# Patient Record
Sex: MALE | Race: WHITE | Employment: FULL TIME | URBAN - METROPOLITAN AREA
[De-identification: names, ages, dates, MRNs, and addresses within clinical notes are randomized per-mention and may not be internally consistent; named-entity substitution may affect disease eponyms.]

---

## 2018-10-29 ENCOUNTER — APPOINTMENT (OUTPATIENT)
Dept: GENERAL RADIOLOGY | Age: 23
End: 2018-10-29
Payer: COMMERCIAL

## 2018-10-29 ENCOUNTER — HOSPITAL ENCOUNTER (EMERGENCY)
Age: 23
Discharge: HOME | End: 2018-10-29
Attending: EMERGENCY MEDICINE
Payer: COMMERCIAL

## 2018-10-29 VITALS
OXYGEN SATURATION: 100 % | DIASTOLIC BLOOD PRESSURE: 52 MMHG | WEIGHT: 130 LBS | HEART RATE: 80 BPM | SYSTOLIC BLOOD PRESSURE: 138 MMHG | HEIGHT: 66 IN | RESPIRATION RATE: 16 BRPM | TEMPERATURE: 98.5 F | BODY MASS INDEX: 20.89 KG/M2

## 2018-10-29 DIAGNOSIS — S63.501A SPRAIN OF RIGHT WRIST, INITIAL ENCOUNTER: Primary | ICD-10-CM

## 2018-10-29 PROCEDURE — 99283 EMERGENCY DEPT VISIT LOW MDM: CPT

## 2018-10-29 PROCEDURE — 73110 X-RAY EXAM OF WRIST: CPT

## 2018-10-29 RX ORDER — IBUPROFEN 800 MG/1
800 TABLET ORAL EVERY 8 HOURS PRN
Qty: 21 TABLET | Refills: 0 | Status: SHIPPED | OUTPATIENT
Start: 2018-10-29 | End: 2018-11-05

## 2018-10-29 ASSESSMENT — PAIN DESCRIPTION - ORIENTATION: ORIENTATION: RIGHT

## 2018-10-29 ASSESSMENT — PAIN DESCRIPTION - PAIN TYPE: TYPE: ACUTE PAIN

## 2018-10-29 ASSESSMENT — PAIN SCALES - GENERAL: PAINLEVEL_OUTOF10: 7

## 2018-10-29 ASSESSMENT — PAIN DESCRIPTION - LOCATION: LOCATION: WRIST

## 2018-10-29 ASSESSMENT — PAIN DESCRIPTION - DESCRIPTORS: DESCRIPTORS: THROBBING;SHARP

## 2018-10-29 ASSESSMENT — PAIN DESCRIPTION - FREQUENCY: FREQUENCY: CONTINUOUS

## 2018-10-30 NOTE — ED PROVIDER NOTES
HPI:  10/29/18, Time: 9:56 PM         Luis Daniel Dolan is a 26 y.o. male presenting to the ED for wrist pain, beginning 1 day ago. The complaint has been persistent, moderate in severity, and worsened by changing position/ movement of the wrist.  Patient thinks he may have inadvertently broken his fall by stretching out his hand. Patient denies any other injury sustained no other complaints pain no marked swelling or redness or warmth of the wrist and no other complaints he does have a prior history of previous wrist surgery    Review of Systems:   Pertinent positives and negatives are stated within HPI, all other systems reviewed and are negative.      --------------------------------------------- PAST HISTORY ---------------------------------------------  Past Medical History:  has a past medical history of Asthma. Past Surgical History:  has a past surgical history that includes Wrist surgery (Right); Foot fracture surgery (Right); and Refractive surgery. Social History:  reports that he has never smoked. He has never used smokeless tobacco. He reports that he does not drink alcohol or use drugs. Family History: family history is not on file. The patients home medications have been reviewed. Allergies: Patient has no known allergies. -------------------------------------------------- RESULTS -------------------------------------------------  All laboratory and radiology results have been personally reviewed by myself   LABS:  No results found for this visit on 10/29/18. RADIOLOGY:  Interpreted by Radiologist.  XR WRIST RIGHT (MIN 3 VIEWS)   Final Result   No acute osseous findings. ------------------------- NURSING NOTES AND VITALS REVIEWED ---------------------------   The nursing notes within the ED encounter and vital signs as below have been reviewed.    BP (!) 138/52   Pulse 80   Temp 98.5 °F (36.9 °C) (Oral)   Resp 16   Ht 5' 6\" (1.676 m)   Wt 130 lb (59 kg)   SpO2 100%

## 2020-02-07 LAB — TSH SERPL DL<=0.05 MIU/L-ACNC: NORMAL M[IU]/L

## 2021-11-05 ENCOUNTER — TELEPHONE (OUTPATIENT)
Dept: PRIMARY CARE CLINIC | Age: 26
End: 2021-11-05
Payer: COMMERCIAL

## 2021-11-05 DIAGNOSIS — Z71.3 DIETARY COUNSELING: Primary | ICD-10-CM

## 2021-11-05 PROCEDURE — S9470 NUTRITIONAL COUNSELING, DIET: HCPCS | Performed by: FAMILY MEDICINE

## 2021-11-05 NOTE — TELEPHONE ENCOUNTER
Patient asking if there is anyone you recommend for a nutritionist or dietician?     Patient wanting to start a plan to eat better

## 2021-12-13 ENCOUNTER — TELEPHONE (OUTPATIENT)
Dept: PRIMARY CARE CLINIC | Age: 26
End: 2021-12-13

## 2021-12-13 ENCOUNTER — OFFICE VISIT (OUTPATIENT)
Dept: PRIMARY CARE CLINIC | Age: 26
End: 2021-12-13
Payer: COMMERCIAL

## 2021-12-13 VITALS
HEIGHT: 66 IN | TEMPERATURE: 98.2 F | OXYGEN SATURATION: 99 % | HEART RATE: 81 BPM | WEIGHT: 130.3 LBS | BODY MASS INDEX: 20.94 KG/M2 | SYSTOLIC BLOOD PRESSURE: 127 MMHG | RESPIRATION RATE: 18 BRPM | DIASTOLIC BLOOD PRESSURE: 84 MMHG

## 2021-12-13 DIAGNOSIS — B96.89 ACUTE BACTERIAL SINUSITIS: Primary | ICD-10-CM

## 2021-12-13 DIAGNOSIS — J01.90 ACUTE BACTERIAL SINUSITIS: Primary | ICD-10-CM

## 2021-12-13 PROCEDURE — 99213 OFFICE O/P EST LOW 20 MIN: CPT | Performed by: NURSE PRACTITIONER

## 2021-12-13 RX ORDER — AZITHROMYCIN 250 MG/1
250 TABLET, FILM COATED ORAL SEE ADMIN INSTRUCTIONS
Qty: 6 TABLET | Refills: 0 | Status: SHIPPED | OUTPATIENT
Start: 2021-12-13 | End: 2021-12-18

## 2021-12-13 SDOH — ECONOMIC STABILITY: FOOD INSECURITY: WITHIN THE PAST 12 MONTHS, THE FOOD YOU BOUGHT JUST DIDN'T LAST AND YOU DIDN'T HAVE MONEY TO GET MORE.: NEVER TRUE

## 2021-12-13 SDOH — ECONOMIC STABILITY: FOOD INSECURITY: WITHIN THE PAST 12 MONTHS, YOU WORRIED THAT YOUR FOOD WOULD RUN OUT BEFORE YOU GOT MONEY TO BUY MORE.: NEVER TRUE

## 2021-12-13 ASSESSMENT — SOCIAL DETERMINANTS OF HEALTH (SDOH): HOW HARD IS IT FOR YOU TO PAY FOR THE VERY BASICS LIKE FOOD, HOUSING, MEDICAL CARE, AND HEATING?: NOT HARD AT ALL

## 2021-12-13 NOTE — TELEPHONE ENCOUNTER
Patient called in stating since Tuesday Dec 7th he has been experiencing sinus drainage, sore throat, headache, fever, and chills. Patient was requesting an appt. Advised patient to be seen at a walk in clinic for his symptoms and he would be treated there. Patient was agreeable.

## 2021-12-13 NOTE — PROGRESS NOTES
Chief Complaint:   Congestion (started last week) and Fatigue      History of Present Illness   Source of history provided by:  patient. Claudy Hernandez is a 27 y.o. old male with a past medical history of:   Past Medical History:   Diagnosis Date    Asthma         Pt presents to the [facility] care with a cough/congestion/sinus pressure/fatigue for the past 7 days. States the cough is  productive with yellow sputum. No fever noted. Denies any N/V/D, abdominal pain, CP, progressive SOB, dizziness, or lethargy. Pt denies any recent exposure to Covid 19        ROS    Unless otherwise stated in this report or unable to obtain because of the patient's clinical or mental status as evidenced by the medical record, this patients's positive and negative responses for Review of Systems, constitutional, psych, eyes, ENT, cardiovascular, respiratory, gastrointestinal, neurological, genitourinary, musculoskeletal, integument systems and systems related to the presenting problem are either stated in the preceding or were not pertinent or were negative for the symptoms and/or complaints related to the medical problem. Past Surgical History:  has a past surgical history that includes Wrist surgery (Right); Foot fracture surgery (Right); and Refractive surgery. Social History:  reports that he has never smoked. He has never used smokeless tobacco. He reports that he does not drink alcohol and does not use drugs. Family History: family history is not on file. Allergies: Patient has no known allergies. Physical Exam         VS:  /84 (Site: Right Upper Arm, Position: Sitting, Cuff Size: Medium Adult)   Pulse 81   Temp 98.2 °F (36.8 °C) (Temporal)   Resp 18   Ht 5' 6\" (1.676 m)   Wt 130 lb 4.8 oz (59.1 kg)   SpO2 99%   BMI 21.03 kg/m²    Oxygen Saturation Interpretation: Normal.    Constitutional:  Alert, development consistent with age. Ears:  External Ears: Normal bilateral pinna.              TM's & External Canals: TM's normal bilaterally without perforation. Canals without erythema or drainage. Nose:   There is no obvious septal defect. Mild/moderate edema to nasal mucosa, sinus tenderness present, + PND  Mouth:  Moist bucca mucosa and normal tongue. Throat: Mild posterior pharyngeal erythema without exudates or lesions. Neck:  Supple. There is no obvious adenopathy or neck tenderness. Lungs:   Breath sounds: Normal chest expansion and breath sounds noted throughout. no wheezes, rales, or rhonchi noted. Heart:  Regular rate and rhythm, normal heart sounds, without pathological murmurs, ectopy, gallops, or rubs. Skin:  Normal turgor. Warm, dry, without visible rash. Neurological:  Oriented. Motor functions intact. Lab / Imaging Results   (All laboratory and radiology results have been personally reviewed by myself)  Labs:  No results found for this visit on 12/13/21. Imaging: All Radiology results interpreted by Radiologist unless otherwise noted. No orders to display         Assessment / Plan     Impression(s):  1. Acute bacterial sinusitis      Disposition:  Disposition: Start Zithromax today, Covid test sent to lab        ER if changes or worse. Advised to take medication as directed.

## 2021-12-14 DIAGNOSIS — J01.90 ACUTE BACTERIAL SINUSITIS: ICD-10-CM

## 2021-12-14 DIAGNOSIS — B96.89 ACUTE BACTERIAL SINUSITIS: ICD-10-CM

## 2021-12-15 LAB
SARS-COV-2: NOT DETECTED
SOURCE: NORMAL

## 2022-11-16 ENCOUNTER — OFFICE VISIT (OUTPATIENT)
Dept: PRIMARY CARE CLINIC | Age: 27
End: 2022-11-16
Payer: COMMERCIAL

## 2022-11-16 VITALS
BODY MASS INDEX: 22.98 KG/M2 | SYSTOLIC BLOOD PRESSURE: 100 MMHG | HEIGHT: 66 IN | OXYGEN SATURATION: 98 % | HEART RATE: 86 BPM | DIASTOLIC BLOOD PRESSURE: 68 MMHG | WEIGHT: 143 LBS | TEMPERATURE: 98.4 F

## 2022-11-16 DIAGNOSIS — J01.90 ACUTE NON-RECURRENT SINUSITIS, UNSPECIFIED LOCATION: Primary | ICD-10-CM

## 2022-11-16 DIAGNOSIS — R09.81 SINUS CONGESTION: ICD-10-CM

## 2022-11-16 DIAGNOSIS — R05.8 PRODUCTIVE COUGH: ICD-10-CM

## 2022-11-16 PROCEDURE — 99213 OFFICE O/P EST LOW 20 MIN: CPT

## 2022-11-16 RX ORDER — FLUTICASONE PROPIONATE 50 MCG
1 SPRAY, SUSPENSION (ML) NASAL DAILY
Qty: 32 G | Refills: 1 | Status: SHIPPED | OUTPATIENT
Start: 2022-11-16

## 2022-11-16 RX ORDER — AMOXICILLIN AND CLAVULANATE POTASSIUM 875; 125 MG/1; MG/1
1 TABLET, FILM COATED ORAL 2 TIMES DAILY
Qty: 14 TABLET | Refills: 0 | Status: SHIPPED | OUTPATIENT
Start: 2022-11-16 | End: 2022-11-23

## 2022-11-16 ASSESSMENT — PATIENT HEALTH QUESTIONNAIRE - PHQ9
SUM OF ALL RESPONSES TO PHQ QUESTIONS 1-9: 0
SUM OF ALL RESPONSES TO PHQ9 QUESTIONS 1 & 2: 0
1. LITTLE INTEREST OR PLEASURE IN DOING THINGS: 0
2. FEELING DOWN, DEPRESSED OR HOPELESS: 0
SUM OF ALL RESPONSES TO PHQ QUESTIONS 1-9: 0

## 2023-02-06 ENCOUNTER — OFFICE VISIT (OUTPATIENT)
Dept: PRIMARY CARE CLINIC | Age: 28
End: 2023-02-06
Payer: COMMERCIAL

## 2023-02-06 VITALS
SYSTOLIC BLOOD PRESSURE: 100 MMHG | HEART RATE: 77 BPM | BODY MASS INDEX: 23.7 KG/M2 | WEIGHT: 147.5 LBS | OXYGEN SATURATION: 99 % | HEIGHT: 66 IN | DIASTOLIC BLOOD PRESSURE: 62 MMHG | TEMPERATURE: 97.5 F

## 2023-02-06 DIAGNOSIS — M77.8 WRIST TENDONITIS: ICD-10-CM

## 2023-02-06 DIAGNOSIS — S69.91XA INJURY OF RIGHT WRIST, INITIAL ENCOUNTER: ICD-10-CM

## 2023-02-06 DIAGNOSIS — M25.531 ACUTE PAIN OF RIGHT WRIST: Primary | ICD-10-CM

## 2023-02-06 PROCEDURE — 99213 OFFICE O/P EST LOW 20 MIN: CPT

## 2023-02-06 RX ORDER — METHYLPREDNISOLONE 4 MG/1
TABLET ORAL
Qty: 1 KIT | Refills: 0 | Status: SHIPPED | OUTPATIENT
Start: 2023-02-06 | End: 2023-02-12

## 2023-02-06 SDOH — ECONOMIC STABILITY: FOOD INSECURITY: WITHIN THE PAST 12 MONTHS, YOU WORRIED THAT YOUR FOOD WOULD RUN OUT BEFORE YOU GOT MONEY TO BUY MORE.: NEVER TRUE

## 2023-02-06 SDOH — ECONOMIC STABILITY: HOUSING INSECURITY
IN THE LAST 12 MONTHS, WAS THERE A TIME WHEN YOU DID NOT HAVE A STEADY PLACE TO SLEEP OR SLEPT IN A SHELTER (INCLUDING NOW)?: NO

## 2023-02-06 SDOH — ECONOMIC STABILITY: FOOD INSECURITY: WITHIN THE PAST 12 MONTHS, THE FOOD YOU BOUGHT JUST DIDN'T LAST AND YOU DIDN'T HAVE MONEY TO GET MORE.: NEVER TRUE

## 2023-02-06 SDOH — ECONOMIC STABILITY: INCOME INSECURITY: HOW HARD IS IT FOR YOU TO PAY FOR THE VERY BASICS LIKE FOOD, HOUSING, MEDICAL CARE, AND HEATING?: NOT HARD AT ALL

## 2023-02-06 ASSESSMENT — PATIENT HEALTH QUESTIONNAIRE - PHQ9
2. FEELING DOWN, DEPRESSED OR HOPELESS: 0
1. LITTLE INTEREST OR PLEASURE IN DOING THINGS: 0
SUM OF ALL RESPONSES TO PHQ9 QUESTIONS 1 & 2: 0
SUM OF ALL RESPONSES TO PHQ QUESTIONS 1-9: 0

## 2023-02-06 NOTE — PROGRESS NOTES
Subjective:  24 y.o. male who presents to the office today with chief complaint:  Chief Complaint   Patient presents with    Wrist Pain     Right wrist pain starting about a month ago. Fingers, hand, & forearm swollen. Patient here with complaints of R wrist pain and swelling. Patient reports no recent trauma or injury to the area. Followed with ortho in the past. 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 Patient is active in boweling, disc golf, and corn hole. States he participated in [employer] over the weekend. Reports discomfort with ROM in R wrist. Occasional R shoulder discomfort. Health Maintenance Due   Topic Date Due    Varicella vaccine (1 of 2 - 2-dose childhood series) Never done    HPV vaccine (1 - Male 2-dose series) Never done    HIV screen  Never done    Hepatitis C screen  Never done    DTaP/Tdap/Td vaccine (7 - Td or Tdap) 07/30/2020    COVID-19 Vaccine (4 - Booster for Pfizer series) 02/02/2022    Flu vaccine (1) 08/01/2022       Cancer History:  Family Cancer History:    Review of Systems    Review of Systems: All bolded are positive, all others are negative. General:  Fever, chills, diaphoresis, fatigue, malaise, night sweats, weight loss  Psychological:  Anxiety, disorientation, hallucinations. ENT:  Epistaxis, headaches, sinus congestion,  vertigo, visual changes. Cardiovascular:  Chest pain, irregular heartbeats, palpitations, paroxysmal nocturnal dyspnea. Respiratory:  Shortness of breath, coughing, sputum production, hemoptysis, wheezing, orthopnea.   Gastrointestinal:  Nausea, vomiting, diarrhea, heartburn, constipation, abdominal pain, hematemesis, hematochezia, melena, acholic stools  Genito-Urinary:  Dysuria, urgency, frequency, hematuria  Musculoskeletal:  Joint pain, joint stiffness, joint swelling, muscle pain  Neurology:  Headache, focal neurological deficits, weakness, numbness, paresthesia  Derm:  Rashes, ulcers, excoriations, bruising  Extremities:  Decreased ROM, peripheral edema, mottling      Objective:  Vitals:    02/06/23 1456   BP: 100/62   Pulse: 77   Temp: 97.5 °F (36.4 °C)   SpO2: 99%     Physical Exam  Vitals and nursing note reviewed. Constitutional:       General: He is not in acute distress. Appearance: Normal appearance. He is not ill-appearing. HENT:      Head: Normocephalic and atraumatic. Right Ear: External ear normal.      Left Ear: External ear normal.      Nose: Nose normal.   Eyes:      Extraocular Movements: Extraocular movements intact. Conjunctiva/sclera: Conjunctivae normal.   Pulmonary:      Effort: Pulmonary effort is normal.   Musculoskeletal:      Right wrist: Swelling present. Decreased range of motion. Comments: R wrist: discomfort with ROM, neurovascularly intact. Negative phalen and tinel. RUE: strength 5/5. Cheng and Neer negative. Liam negative. Adson test negative. Neurological:      Mental Status: He is alert. Results for orders placed or performed in visit on 05/18/22   TSH   Result Value Ref Range    TSH           Assessment and Plan:  Zena Winn was seen today for wrist pain. Diagnoses and all orders for this visit:    Acute pain of right wrist  -     methylPREDNISolone (MEDROL DOSEPACK) 4 MG tablet; Take by mouth. -     XR WRIST RIGHT (MIN 3 VIEWS); Future    Injury of right wrist, initial encounter  -     methylPREDNISolone (MEDROL DOSEPACK) 4 MG tablet; Take by mouth. -     XR WRIST RIGHT (MIN 3 VIEWS); Future    Wrist tendonitis  -     methylPREDNISolone (MEDROL DOSEPACK) 4 MG tablet; Take by mouth. R wrist: Medrol dosepack. Wrist XR. Further workup pending imaging. Advised patient on RICE therapy. Consider ortho referral due to history. Monitor for improvement. Patient will contact office if no improvement in sxs. No follow-ups on file.       Patient may come in sooner if needed for medical concerns. Patient advised to call at any time to cancel, re-schedule, or for any questions/concerns.             Adri Blake PA-C    2/6/23  2:08 PM

## 2023-02-10 ENCOUNTER — TELEPHONE (OUTPATIENT)
Dept: PRIMARY CARE CLINIC | Age: 28
End: 2023-02-10

## 2023-02-10 DIAGNOSIS — S69.91XA INJURY OF RIGHT WRIST, INITIAL ENCOUNTER: ICD-10-CM

## 2023-02-10 DIAGNOSIS — M25.531 ACUTE PAIN OF RIGHT WRIST: ICD-10-CM

## 2023-02-10 NOTE — TELEPHONE ENCOUNTER
Pt notified of xray wrist results.   Xray resulted normal. Appt scheduled with martínez abdi for 2/15/23 @245pm.

## 2023-02-10 NOTE — TELEPHONE ENCOUNTER
PC from patient, states he received his Xray results and just wanted to follow up on what was discussed with Felix    Asking if due to his family hx, if it might be best that he just have the MRI as opposed to the US/Xray of Right Shoulder?    States it was discussed during his visit a few days ago    If MRI not recommended, asking if the US/Xray can just be ordered as opposed to him coming in for a follow up visit first?      Please, advise

## 2023-02-10 NOTE — TELEPHONE ENCOUNTER
----- Message from [address] sent at 2/9/2023  4:22 PM EST -----  Subject: Results Request    QUESTIONS  Results: XR WRIST RIGHT;  Ordered by: Yesenia Dawson   Date Performed: 2023-02-06  ---------------------------------------------------------------------------  --------------  Manuel Correa INFO    [phone]; OK to leave message on voicemail  ---------------------------------------------------------------------------  --------------

## 2023-02-13 DIAGNOSIS — M25.511 ACUTE PAIN OF RIGHT SHOULDER: Primary | ICD-10-CM

## 2023-02-13 NOTE — TELEPHONE ENCOUNTER
PC from patient returning call after office left a VM. Patient would please like you to order the right shoulder XR.

## 2023-02-13 NOTE — TELEPHONE ENCOUNTER
PC to patient to inform him of the following: In order for insurance to cover the MRI an XR would be needed first. He would complete shoulder XR followed my shoulder MRI. PC to patient to also ask him the following:    Did he start the medrol dosepack? Left VM.

## 2023-02-15 DIAGNOSIS — M25.511 ACUTE PAIN OF RIGHT SHOULDER: ICD-10-CM

## 2023-02-17 DIAGNOSIS — S69.91XA INJURY OF RIGHT WRIST, INITIAL ENCOUNTER: ICD-10-CM

## 2023-02-17 DIAGNOSIS — M25.531 ACUTE PAIN OF RIGHT WRIST: Primary | ICD-10-CM

## 2023-02-23 ENCOUNTER — TELEPHONE (OUTPATIENT)
Dept: PRIMARY CARE CLINIC | Age: 28
End: 2023-02-23

## 2023-02-23 NOTE — TELEPHONE ENCOUNTER
PC to patient. Patient states that there is no improvement. Forearm & hand are still swollen. Pain in the wrist is still there. Patient requested Ibuprofen 800 mg for the swelling & pain until things are worked out with the MRI.

## 2023-02-23 NOTE — TELEPHONE ENCOUNTER
PC from [facility] at [facility] stating she started the prior auth process for patient to have wrist MRI but his insurance is requiring a Woxy-dy-Qkqs because patient did not meet medical necessity    Asking Harish Moreau to call [phone] and use Order# [id number]    The cnxq-oo-loos must be completed before March 2 or the case will be closed.

## 2023-02-24 DIAGNOSIS — M25.531 ACUTE PAIN OF RIGHT WRIST: Primary | ICD-10-CM

## 2023-02-24 RX ORDER — PREDNISONE 20 MG/1
20 TABLET ORAL DAILY
Qty: 5 TABLET | Refills: 0 | Status: SHIPPED | OUTPATIENT
Start: 2023-02-24 | End: 2023-03-01

## 2023-02-24 RX ORDER — MELOXICAM 15 MG/1
15 TABLET ORAL DAILY
Qty: 30 TABLET | Refills: 0 | Status: SHIPPED | OUTPATIENT
Start: 2023-02-24

## 2023-02-24 NOTE — TELEPHONE ENCOUNTER
Left message for pt on voicemail letting him know [id number] was completed and patient should be approved for MRI. He can complete this at his convenience. Will send in Mobic 15 mg daily. Advise him not to combine with any other NSAIDs/anti-inflammatories with this. Will also send him prednisone.

## 2023-03-03 ENCOUNTER — TELEPHONE (OUTPATIENT)
Dept: PRIMARY CARE CLINIC | Age: 28
End: 2023-03-03

## 2023-03-03 NOTE — TELEPHONE ENCOUNTER
PC from patient returning VM from the office. Patient was informed that he is already on the max dose of Mobic 15 mg daily. Advised to try Voltaren which is another type of anti-inflammatory. Patient agreed to try it. He was also advised if his pain is getting worse and uncontrolled by NSAIDs he should go to emergency department. His MRI was 3/1/23. Patient stated that we should be getting the results around Monday. If the MRI comes back negative, patient stated that \"he would like to discuss possible nerve entrapment. \" Stated that he was still experiencing swelling. Starting to experience loss in  strength.

## 2023-03-03 NOTE — TELEPHONE ENCOUNTER
PC from patient asking if he can have his Meloxicam increased?   States it is not helping with his pain    Please, advise

## 2023-03-06 DIAGNOSIS — M25.531 ACUTE PAIN OF RIGHT WRIST: ICD-10-CM

## 2023-03-06 DIAGNOSIS — M25.531 RIGHT WRIST PAIN: Primary | ICD-10-CM

## 2023-03-06 DIAGNOSIS — S69.91XA INJURY OF RIGHT WRIST, INITIAL ENCOUNTER: ICD-10-CM

## 2023-03-07 ENCOUNTER — TELEPHONE (OUTPATIENT)
Dept: PRIMARY CARE CLINIC | Age: 28
End: 2023-03-07

## 2023-03-07 NOTE — TELEPHONE ENCOUNTER
----- Message from Wes Varghese sent at 3/7/2023 11:03 AM EST -----  Subject: Referral Request    Reason for referral request? ortho  Provider patient wants to be referred to(if known): Karlo Ernandez    Provider Phone Number(if known):[phone]    Additional Information for Provider?   ---------------------------------------------------------------------------  --------------  [id number]    [phone]; OK to leave message on voicemail  ---------------------------------------------------------------------------  --------------

## 2023-03-09 DIAGNOSIS — M25.431 PAIN AND SWELLING OF RIGHT WRIST: Primary | ICD-10-CM

## 2023-03-09 DIAGNOSIS — M25.531 PAIN AND SWELLING OF RIGHT WRIST: Primary | ICD-10-CM

## 2023-03-09 NOTE — TELEPHONE ENCOUNTER
PC to patient to inform him that Dr. Lexa Land has retired so we have some other options for an ortho referral.     Patient can  all his X-rays & MRI results, & go directly to [facility] clinic. Patient can be put into the [facility]) [facility], & then someone will contact him. Patient would like you to please go through the [address] [facility].

## 2023-03-10 ENCOUNTER — TELEPHONE (OUTPATIENT)
Dept: ORTHOPEDIC SURGERY | Age: 28
End: 2023-03-10

## 2023-03-10 NOTE — TELEPHONE ENCOUNTER
Patient returned call asking to schedule with two providers that were outside of [facility] ([facility]). Explained I was only able to schedule within [facility]. Provided him the telephone numbers for Dr. Clayton Saul in [facility], and Beau Sanderson in [address] [place] [address] which were the providers he requested.

## 2023-03-14 ENCOUNTER — TELEPHONE (OUTPATIENT)
Dept: PRIMARY CARE CLINIC | Age: 28
End: 2023-03-14

## 2023-03-14 NOTE — TELEPHONE ENCOUNTER
PC from patient to discuss his ortho referral. Patient stated that he was called & asked to research which ortho provider he would be interested him. He was calling to see if it was this office that called him. Informed patient that the last [facility] contact was the [facility]. Patient stated that he was just going to call the number that had contacted him previously.

## 2023-09-18 NOTE — PROGRESS NOTES
Male Subjective:  24 y.o. male who presents to the office today with chief complaint:  Chief Complaint   Patient presents with    Congestion    Cough     Pt states he has had cold congestion and cough x past week. He has been using Mucinex/Mucinex DM. Drainage and phlegm yellow /green in color. Pt denies checking for Covid    Pain     L ear discomfort over past year. He notices at times even looses his balance. Sick visit: Patient states sxs have been ongoing for over a week. States sxs started with sinus congestion and then moved into his chest. Reports rhinorrhea and productive cough. Describes sputum has a dark yellow/green. Has been using mucinex at home with some relief. Also reports some discomfort in the L ear. States sometimes with position changes he becomes dizzy. NO further work up completed for this or ENT evaluation. Denies fever/chills, SOB, or sick contacts. Health Maintenance Due   Topic Date Due    Varicella vaccine (1 of 2 - 2-dose childhood series) Never done    HPV vaccine (1 - Male 2-dose series) Never done    HIV screen  Never done    Hepatitis C screen  Never done    DTaP/Tdap/Td vaccine (7 - Td or Tdap) 07/30/2020    COVID-19 Vaccine (4 - Booster for Pfizer series) 02/02/2022    Flu vaccine (1) 08/01/2022       Cancer History:  Family Cancer History:    Review of Systems    Review of Systems: All bolded are positive, all others are negative. General:  Fever, chills, diaphoresis, fatigue, malaise, night sweats, weight loss  Psychological:  Anxiety, disorientation, hallucinations. ENT:  Epistaxis, headaches, sinus congestion,  vertigo, visual changes. Cardiovascular:  Chest pain, irregular heartbeats, palpitations, paroxysmal nocturnal dyspnea. Respiratory:  Shortness of breath, coughing, sputum production, hemoptysis, wheezing, orthopnea.   Gastrointestinal:  Nausea, vomiting, diarrhea, heartburn, constipation, abdominal pain, hematemesis, hematochezia, melena, acholic stools  Genito-Urinary:  Dysuria, urgency, frequency, hematuria  Musculoskeletal:  Joint pain, joint stiffness, joint swelling, muscle pain  Neurology:  Headache, focal neurological deficits, weakness, numbness, paresthesia  Derm:  Rashes, ulcers, excoriations, bruising  Extremities:  Decreased ROM, peripheral edema, mottling      Objective:  Vitals:    11/16/22 1436   BP: 100/68   Pulse: 86   Temp: 98.4 °F (36.9 °C)   SpO2: 98%     Physical Exam  Vitals and nursing note reviewed. Constitutional:       General: He is not in acute distress. Appearance: Normal appearance. HENT:      Head: Normocephalic and atraumatic. Right Ear: Hearing, tympanic membrane, ear canal and external ear normal.      Left Ear: Hearing, tympanic membrane, ear canal and external ear normal.      Nose: Mucosal edema, congestion and rhinorrhea present. Rhinorrhea is purulent. Right Turbinates: Swollen. Left Turbinates: Swollen. Mouth/Throat:      Pharynx: Posterior oropharyngeal erythema present. Eyes:      Extraocular Movements: Extraocular movements intact. Conjunctiva/sclera: Conjunctivae normal.      Pupils: Pupils are equal, round, and reactive to light. Cardiovascular:      Rate and Rhythm: Normal rate and regular rhythm. Pulses: Normal pulses. Heart sounds: Normal heart sounds. Pulmonary:      Effort: Pulmonary effort is normal.      Breath sounds: Normal breath sounds. Comments: CTA  Abdominal:      General: Abdomen is flat. Bowel sounds are normal.      Palpations: Abdomen is soft. Musculoskeletal:         General: Normal range of motion. Cervical back: Normal range of motion. Skin:     General: Skin is warm and dry. Neurological:      Mental Status: He is alert. Results for orders placed or performed in visit on 05/18/22   TSH   Result Value Ref Range    TSH           Assessment and Plan:  Radha Rocha was seen today for congestion, cough and pain.     Diagnoses and all orders for this visit:    Acute non-recurrent sinusitis, unspecified location  -     amoxicillin-clavulanate (AUGMENTIN) 875-125 MG per tablet; Take 1 tablet by mouth 2 times daily for 7 days  -     fluticasone (FLONASE) 50 MCG/ACT nasal spray; 1 spray by Each Nostril route daily    Sinus congestion  -     amoxicillin-clavulanate (AUGMENTIN) 875-125 MG per tablet; Take 1 tablet by mouth 2 times daily for 7 days    Productive cough  -     amoxicillin-clavulanate (AUGMENTIN) 875-125 MG per tablet; Take 1 tablet by mouth 2 times daily for 7 days      Sinusitis: Augmentin 875-125 mg BID x 7 days. Flonase BID PRN. Offered patient return visit to evaluate L ear after sinusitis has cleared, but patient states he will call the office. If sxs do not improve or worsen he will contact the office. No follow-ups on file. Patient may come in sooner if needed for medical concerns. Patient advised to call at any time to cancel, re-schedule, or for any questions/concerns.             Niurka Bocanegra PA-C    11/16/22  8:38 AM

## 2024-03-11 ENCOUNTER — OFFICE VISIT (OUTPATIENT)
Dept: PRIMARY CARE CLINIC | Age: 29
End: 2024-03-11
Payer: COMMERCIAL

## 2024-03-11 VITALS
HEIGHT: 66 IN | HEART RATE: 76 BPM | WEIGHT: 141 LBS | OXYGEN SATURATION: 98 % | DIASTOLIC BLOOD PRESSURE: 78 MMHG | TEMPERATURE: 97.8 F | BODY MASS INDEX: 22.66 KG/M2 | SYSTOLIC BLOOD PRESSURE: 100 MMHG

## 2024-03-11 DIAGNOSIS — S76.212A STRAIN OF LEFT GROIN: ICD-10-CM

## 2024-03-11 DIAGNOSIS — R10.32 DISCOMFORT OF LEFT GROIN: Primary | ICD-10-CM

## 2024-03-11 DIAGNOSIS — F34.1 DYSTHYMIA: ICD-10-CM

## 2024-03-11 LAB
BILIRUBIN, POC: NORMAL
BLOOD URINE, POC: NORMAL
CLARITY, POC: CLEAR
COLOR, POC: NORMAL
GLUCOSE URINE, POC: NORMAL
KETONES, POC: NORMAL
LEUKOCYTE EST, POC: NORMAL
NITRITE, POC: NORMAL
PH, POC: 7
PROTEIN, POC: NORMAL
SPECIFIC GRAVITY, POC: 1.01
UROBILINOGEN, POC: 0.2

## 2024-03-11 PROCEDURE — 81002 URINALYSIS NONAUTO W/O SCOPE: CPT | Performed by: FAMILY MEDICINE

## 2024-03-11 PROCEDURE — 99213 OFFICE O/P EST LOW 20 MIN: CPT | Performed by: FAMILY MEDICINE

## 2024-03-11 RX ORDER — METHYLPREDNISOLONE 4 MG/1
TABLET ORAL
Qty: 1 KIT | Refills: 0 | Status: SHIPPED | OUTPATIENT
Start: 2024-03-11

## 2024-03-11 RX ORDER — FLUOXETINE HYDROCHLORIDE 20 MG/1
20 CAPSULE ORAL DAILY
Qty: 30 CAPSULE | Refills: 2 | Status: SHIPPED | OUTPATIENT
Start: 2024-03-11

## 2024-03-11 SDOH — ECONOMIC STABILITY: FOOD INSECURITY: WITHIN THE PAST 12 MONTHS, YOU WORRIED THAT YOUR FOOD WOULD RUN OUT BEFORE YOU GOT MONEY TO BUY MORE.: NEVER TRUE

## 2024-03-11 SDOH — ECONOMIC STABILITY: INCOME INSECURITY: HOW HARD IS IT FOR YOU TO PAY FOR THE VERY BASICS LIKE FOOD, HOUSING, MEDICAL CARE, AND HEATING?: NOT HARD AT ALL

## 2024-03-11 SDOH — ECONOMIC STABILITY: FOOD INSECURITY: WITHIN THE PAST 12 MONTHS, THE FOOD YOU BOUGHT JUST DIDN'T LAST AND YOU DIDN'T HAVE MONEY TO GET MORE.: NEVER TRUE

## 2024-03-11 ASSESSMENT — PATIENT HEALTH QUESTIONNAIRE - PHQ9
3. TROUBLE FALLING OR STAYING ASLEEP: 0
8. MOVING OR SPEAKING SO SLOWLY THAT OTHER PEOPLE COULD HAVE NOTICED. OR THE OPPOSITE, BEING SO FIGETY OR RESTLESS THAT YOU HAVE BEEN MOVING AROUND A LOT MORE THAN USUAL: 0
7. TROUBLE CONCENTRATING ON THINGS, SUCH AS READING THE NEWSPAPER OR WATCHING TELEVISION: 0
SUM OF ALL RESPONSES TO PHQ QUESTIONS 1-9: 0
2. FEELING DOWN, DEPRESSED OR HOPELESS: 0
SUM OF ALL RESPONSES TO PHQ QUESTIONS 1-9: 0
SUM OF ALL RESPONSES TO PHQ QUESTIONS 1-9: 0
1. LITTLE INTEREST OR PLEASURE IN DOING THINGS: 0
6. FEELING BAD ABOUT YOURSELF - OR THAT YOU ARE A FAILURE OR HAVE LET YOURSELF OR YOUR FAMILY DOWN: 0
10. IF YOU CHECKED OFF ANY PROBLEMS, HOW DIFFICULT HAVE THESE PROBLEMS MADE IT FOR YOU TO DO YOUR WORK, TAKE CARE OF THINGS AT HOME, OR GET ALONG WITH OTHER PEOPLE: 0
SUM OF ALL RESPONSES TO PHQ9 QUESTIONS 1 & 2: 0
5. POOR APPETITE OR OVEREATING: 0
4. FEELING TIRED OR HAVING LITTLE ENERGY: 0
9. THOUGHTS THAT YOU WOULD BE BETTER OFF DEAD, OR OF HURTING YOURSELF: 0
SUM OF ALL RESPONSES TO PHQ QUESTIONS 1-9: 0

## 2024-03-11 ASSESSMENT — ENCOUNTER SYMPTOMS: GASTROINTESTINAL NEGATIVE: 1

## 2024-03-11 NOTE — PROGRESS NOTES
TSH  No results found for: \"TSH\"    A1C  No results found for: \"LABA1C\"    LIPID  No results found for: \"CHOL\", \"TRIG\", \"HDL\", \"LDLCHOLESTEROL\", \"LDLCALC\", \"VLDL\", \"CHOLHDLRATIO\"     Results for POC orders placed in visit on 03/11/24   POCT Urinalysis no Micro   Result Value Ref Range    Color, UA opaque     Clarity, UA clear     Glucose, UA POC neg     Bilirubin, UA neg     Ketones, UA neg     Spec Grav, UA 1.015     Blood, UA POC neg     pH, UA 7.0     Protein, UA POC neg     Urobilinogen, UA 0.2     Leukocytes, UA neg     Nitrite, UA neg        An electronic signature was used to authenticate this note.    --Pancho Doan, DO

## 2024-05-13 ENCOUNTER — TELEPHONE (OUTPATIENT)
Dept: PRIMARY CARE CLINIC | Age: 29
End: 2024-05-13

## 2024-05-13 NOTE — TELEPHONE ENCOUNTER
Patient called in due to stoving his right pinky finger a little over a month ago. When this first occurred patient was unable to move his finger. As time went on, he gained mobility. He is now able to make a fist, but that finger is still swollen. It permanently points downward now.    PCP's next available appointment is June 11th. Advised patient to seek assessment/treatment at [place] urgent care or [facility] urgent care to obtain imaging.    Patient verbalized understanding.

## 2024-05-15 ENCOUNTER — OFFICE VISIT (OUTPATIENT)
Dept: FAMILY MEDICINE CLINIC | Age: 29
End: 2024-05-15
Payer: COMMERCIAL

## 2024-05-15 VITALS
TEMPERATURE: 97.4 F | OXYGEN SATURATION: 98 % | HEIGHT: 66 IN | RESPIRATION RATE: 17 BRPM | WEIGHT: 141 LBS | HEART RATE: 67 BPM | DIASTOLIC BLOOD PRESSURE: 71 MMHG | BODY MASS INDEX: 22.66 KG/M2 | SYSTOLIC BLOOD PRESSURE: 119 MMHG

## 2024-05-15 DIAGNOSIS — S69.91XA INJURY OF FINGER OF RIGHT HAND, INITIAL ENCOUNTER: Primary | ICD-10-CM

## 2024-05-15 PROCEDURE — 99213 OFFICE O/P EST LOW 20 MIN: CPT

## 2024-05-15 NOTE — PROGRESS NOTES
Chief Complaint       Finger Injury (Pinky on right hand hurt it month ago)      History of Present Illness   Source of history provided by:  patient.      Rafael West is a 28 y.o. old male presenting to the walk in clinic for evaluation of right hand pinky finger pain. Pt reports injuring the site while he stubbed his finger on his truck one month ago. Reports associated swelling and bruising. Denies any paresthesias, obvious deformity, hand pain, wrist pain, weakness, fever, chills, N/V, or abrasions. Pt states there is increased pain with flexion and full extension. Has tried taking Ibuprofen OTC with minimal symptomatic relief. Denies any hx of previous injuries or surgeries at the site.    ROS    Pertinent positives and negatives are stated within HPI, all other systems reviewed and are negative.      Physical Exam           Vitals:    05/15/24 1623   BP: 119/71   Pulse: 67   Resp: 17   Temp: 97.4 °F (36.3 °C)   SpO2: 98%     Oxygen Saturation Interpretation: Normal.    Constitutional:  Alert, development consistent with age.  Neck:  Normal ROM.  Supple. Non-tender.  Fingers:             Tenderness: Mild TTP over pinky finger.             Swelling: Mild edema noted over pinky.             Deformity: No gross deformity noted.            ROM: normal            Skin: No bruising noted.  No abrasions, rashes, or erythema noted.       Neurovascular:              Motor deficit: None.            Sensory deficit:  Sensation intact above and below the injury site.             Pulse deficit: None.              Capillary refill: Less than 2 sec throughout.  Hand:             Tenderness: None.              Swelling: None.             Deformity: None.             ROM: ROM physiologic.            Skin: Normal.  Wrist:               Tenderness:  None.               Swelling: None.            Deformity: None.             ROM: ROM physiologic.            Skin:  Normal.    Lymphatics: No lymphangitis or adenopathy

## 2024-05-17 ENCOUNTER — TELEPHONE (OUTPATIENT)
Dept: ORTHOPEDIC SURGERY | Age: 29
End: 2024-05-17

## 2024-05-17 NOTE — TELEPHONE ENCOUNTER
Contacted patient regarding referral for right 5th digit injury. Patient is known previously to [facility] as he was referred for wrist injury one year ago.  Patient had similar right finger injury in 2016 and treated with Dr. Monroy.  Patient declined to schedule.  Requested information to be sent to Dr. Katina Ivory.

## 2024-05-28 ENCOUNTER — TELEPHONE (OUTPATIENT)
Dept: PRIMARY CARE CLINIC | Age: 29
End: 2024-05-28

## 2024-05-28 NOTE — TELEPHONE ENCOUNTER
Asking if we are able to refer to Dr. Ivory?    Patient was seen at [facility] on 5/15/24 for finger injury (pinky right hand) as appointments were not available at our office that day    Patient was seen, had an R Hand Xray, referral to ortho was recommended.  Pt wanted to see Dr. JUDITH Ivory at [facility], so the [facility] navigator sent a referral     Patient heard from Dr. Ivory's office, but they are not able to schedule without an prior auth from PCP's office due to insurance    Are you able to refer patient based on diagnosis from that encounter/test?  Or does the patient need to schedule an appointment with you to be assessed?     Please, advise

## 2024-05-29 DIAGNOSIS — M20.011 MALLET DEFORMITY OF RIGHT LITTLE FINGER: Primary | ICD-10-CM

## 2024-05-29 NOTE — TELEPHONE ENCOUNTER
PC to patient to inform that Dr. Doan was able to refer patient to Dr. Ivory so that we were able to obtain a prior authorization for the referral through his insurance.  Left patient a detailed voice mail informing that we will initiate auth later today

## 2024-09-11 ENCOUNTER — OFFICE VISIT (OUTPATIENT)
Dept: PRIMARY CARE CLINIC | Age: 29
End: 2024-09-11
Payer: COMMERCIAL

## 2024-09-11 VITALS
TEMPERATURE: 98.2 F | WEIGHT: 153 LBS | BODY MASS INDEX: 24.59 KG/M2 | SYSTOLIC BLOOD PRESSURE: 100 MMHG | DIASTOLIC BLOOD PRESSURE: 70 MMHG | OXYGEN SATURATION: 98 % | HEART RATE: 77 BPM | HEIGHT: 66 IN

## 2024-09-11 DIAGNOSIS — F34.1 DYSTHYMIA: ICD-10-CM

## 2024-09-11 DIAGNOSIS — N45.1 EPIDIDYMITIS, LEFT: Primary | ICD-10-CM

## 2024-09-11 PROCEDURE — 99213 OFFICE O/P EST LOW 20 MIN: CPT | Performed by: FAMILY MEDICINE

## 2024-09-11 ASSESSMENT — ENCOUNTER SYMPTOMS: GASTROINTESTINAL NEGATIVE: 1

## 2024-09-26 ENCOUNTER — TELEPHONE (OUTPATIENT)
Dept: PRIMARY CARE CLINIC | Age: 29
End: 2024-09-26

## 2024-12-02 ENCOUNTER — OFFICE VISIT (OUTPATIENT)
Dept: FAMILY MEDICINE CLINIC | Age: 29
End: 2024-12-02
Payer: COMMERCIAL

## 2024-12-02 ENCOUNTER — TELEPHONE (OUTPATIENT)
Dept: PRIMARY CARE CLINIC | Age: 29
End: 2024-12-02

## 2024-12-02 VITALS
WEIGHT: 153 LBS | SYSTOLIC BLOOD PRESSURE: 100 MMHG | TEMPERATURE: 98.3 F | OXYGEN SATURATION: 97 % | HEART RATE: 70 BPM | RESPIRATION RATE: 17 BRPM | BODY MASS INDEX: 24.59 KG/M2 | DIASTOLIC BLOOD PRESSURE: 60 MMHG | HEIGHT: 66 IN

## 2024-12-02 DIAGNOSIS — J32.9 SINOBRONCHITIS: Primary | ICD-10-CM

## 2024-12-02 DIAGNOSIS — R09.81 SINUS CONGESTION: ICD-10-CM

## 2024-12-02 DIAGNOSIS — R05.1 ACUTE COUGH: ICD-10-CM

## 2024-12-02 DIAGNOSIS — J40 SINOBRONCHITIS: Primary | ICD-10-CM

## 2024-12-02 PROCEDURE — 99213 OFFICE O/P EST LOW 20 MIN: CPT | Performed by: NURSE PRACTITIONER

## 2024-12-02 RX ORDER — BROMPHENIRAMINE MALEATE, PSEUDOEPHEDRINE HYDROCHLORIDE, AND DEXTROMETHORPHAN HYDROBROMIDE 2; 30; 10 MG/5ML; MG/5ML; MG/5ML
SYRUP ORAL
Qty: 180 ML | Refills: 0 | Status: SHIPPED | OUTPATIENT
Start: 2024-12-02

## 2024-12-02 NOTE — TELEPHONE ENCOUNTER
----- Message from Chayito SUSY sent at 12/2/2024 10:42 AM EST -----  Regarding: ECC Escalation To Practice  ECC Escalation To Practice      Type of Escalation: Acute Care Symptom  --------------------------------------------------------------------------------------------------------------------------    Information for Provider:  Patient is looking for appointment for: Symptom Flu and cough   Reasons for Message: Unable to reach practice     Additional Information : The patient is experiencing flu,cough, stuffy nose and sinus drainage and he wants to be seen either today or for the following days with his primary care provider Pancho Olivarez DO      --------------------------------------------------------------------------------------------------------------------------    Relationship to Patient: Self     Call Back Info: OK to leave message on voicemail  Preferred Call Back Number: Phone [phone]

## 2024-12-02 NOTE — PROGRESS NOTES
24  Rafael Wset : 1995 Sex: male  Age 29 y.o.    Subjective:  Chief Complaint   Patient presents with    Cough    Nasal Congestion     Drainage down throat. Present for 1 1/2 week       HPI:   Rafael West , 29 y.o. male presents to the clinic for evaluation of sinus congestion x 10 days. The patient also reports cough, chest congestion. The patient has taken Mucinex-D for symptoms. The patient reports unchanged symptoms over time. The patient reports ill exposure (coworkers). The patient denies headache, sore throat, rash, and fever. The patient also denies chest pain, abdominal pain, shortness of breath, wheezing, and nausea / vomiting / diarrhea.    ROS:   Unless otherwise stated in this report the patient's positive and negative responses for review of systems for constitutional, eyes, ENT, cardiovascular, respiratory, gastrointestinal, neurological, , musculoskeletal, and integument systems and related systems to the presenting problem are either stated in the history of present illness or were not pertinent or were negative for the symptoms and/or complaints related to the presenting medical problem.  Positives and pertinent negatives as per HPI.  All others reviewed and are negative.      PMH:     Past Medical History:   Diagnosis Date    Asthma     Cancer (HCC) 2017    Basal Cell Carcinoma Neck    Hyperactive airway disease     Vitamin D deficiency        Past Surgical History:   Procedure Laterality Date    CARPAL TUNNEL RELEASE Right 2023    ELBOW SURGERY  2023    FOOT FRACTURE SURGERY Right     REFRACTIVE SURGERY      WRIST SURGERY Right        Family History   Problem Relation Age of Onset    Other Mother         H/O Migraine Cephalgia       Medications:     Current Outpatient Medications:     amoxicillin-clavulanate (AUGMENTIN) 875-125 MG per tablet, Take 1 tablet by mouth 2 times daily for 10 days, Disp: 20 tablet, Rfl: 0    brompheniramine-pseudoephedrine-DM 2-30-10

## 2024-12-02 NOTE — TELEPHONE ENCOUNTER
PC to pt, informing provider has no available appts and recommended pt go to Walk in either in [place] on [address] or [facility] [address].    Pt voiced understanding and all questions were answered for pt.

## 2024-12-30 ENCOUNTER — TELEPHONE (OUTPATIENT)
Dept: PRIMARY CARE CLINIC | Age: 29
End: 2024-12-30

## 2024-12-30 ENCOUNTER — OFFICE VISIT (OUTPATIENT)
Dept: PRIMARY CARE CLINIC | Age: 29
End: 2024-12-30
Payer: COMMERCIAL

## 2024-12-30 VITALS
BODY MASS INDEX: 23.14 KG/M2 | DIASTOLIC BLOOD PRESSURE: 67 MMHG | HEART RATE: 83 BPM | WEIGHT: 144 LBS | HEIGHT: 66 IN | OXYGEN SATURATION: 97 % | TEMPERATURE: 98.2 F | SYSTOLIC BLOOD PRESSURE: 99 MMHG

## 2024-12-30 DIAGNOSIS — S30.860A TICK BITE OF LOWER BACK, INITIAL ENCOUNTER: Primary | ICD-10-CM
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
## 2024-12-30 PROCEDURE — 99213 OFFICE O/P EST LOW 20 MIN: CPT | Performed by: NURSE PRACTITIONER

## 2024-12-30 RX ORDER — DOXYCYCLINE HYCLATE 100 MG
100 TABLET ORAL 2 TIMES DAILY
Qty: 20 TABLET | Refills: 0 | Status: SHIPPED | OUTPATIENT
Start: 2024-12-30 | End: 2025-01-09

## 2024-12-30 NOTE — PROGRESS NOTES
Chief Complaint:   Insect Bite (Tick bite on right side, pt removed tick last night, the area is red and purple in color)      History of Present Illness   Source of history provided by:  patient.      Rafael West is a 29 y.o. old male with a past medical history of:   Past Medical History:   Diagnosis Date    Asthma     Cancer (HCC) 2017    Basal Cell Carcinoma Neck    Hyperactive airway disease     Vitamin D deficiency        Pt  presents to the [facility] with tick bite to right side of lower back. Pt removed tick last evening, unsure how long tick was embedded.  Pt stated area is reddened w/purplish discoloration. No fever noted.  Denies any N/V/D, muscular/joint pain, rashes, abdominal pain, CP, progressive SOB, dizziness, or lethargy.         ROS    Unless otherwise stated in this report or unable to obtain because of the patient's clinical or mental status as evidenced by the medical record, this patients's positive and negative responses for Review of Systems, constitutional, psych, eyes, ENT, cardiovascular, respiratory, gastrointestinal, neurological, genitourinary, musculoskeletal, integument systems and systems related to the presenting problem are either stated in the preceding or were not pertinent or were negative for the symptoms and/or complaints related to the medical problem.    Past Surgical History:  has a past surgical history that includes Wrist surgery (Right); Foot fracture surgery (Right); Refractive surgery; Elbow surgery (05/16/2023); and Carpal tunnel release (Right, 05/16/2023).  Social History:  reports that he has never smoked. He has never used smokeless tobacco. He reports that he does not drink alcohol and does not use drugs.  Family History: family history includes Other in his mother.   Allergies: Patient has no known allergies.    Physical Exam         VS:  BP 99/67   Pulse 83   Temp 98.2 °F (36.8 °C)   Ht 1.676 m (5' 6\")   Wt 65.3 kg (144 lb)   SpO2 97%   BMI 23.24

## 2024-12-30 NOTE — TELEPHONE ENCOUNTER
PC from patient requesting an urgent appointment for tick bite  Patient states the area is purple with a ring around it and is wanting it to be looked at and prescribed possible antibiotic  Spoke to clinic staff.  Pt directed to go to [facility] or [facility] as there are no providers in the office today or tomorrow  Pt acknowledged understanding

## 2025-01-27 ENCOUNTER — OFFICE VISIT (OUTPATIENT)
Dept: PRIMARY CARE CLINIC | Age: 30
End: 2025-01-27

## 2025-01-27 VITALS
HEART RATE: 86 BPM | TEMPERATURE: 97.4 F | BODY MASS INDEX: 23.78 KG/M2 | HEIGHT: 66 IN | SYSTOLIC BLOOD PRESSURE: 112 MMHG | WEIGHT: 148 LBS | OXYGEN SATURATION: 98 % | DIASTOLIC BLOOD PRESSURE: 60 MMHG

## 2025-01-27 DIAGNOSIS — S20.461S TICK BITE OF RIGHT BACK WALL OF THORAX, SEQUELA: Primary | ICD-10-CM
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
## 2025-01-27 SDOH — ECONOMIC STABILITY: FOOD INSECURITY: WITHIN THE PAST 12 MONTHS, THE FOOD YOU BOUGHT JUST DIDN'T LAST AND YOU DIDN'T HAVE MONEY TO GET MORE.: NEVER TRUE

## 2025-01-27 SDOH — ECONOMIC STABILITY: FOOD INSECURITY: WITHIN THE PAST 12 MONTHS, YOU WORRIED THAT YOUR FOOD WOULD RUN OUT BEFORE YOU GOT MONEY TO BUY MORE.: NEVER TRUE

## 2025-01-27 ASSESSMENT — PATIENT HEALTH QUESTIONNAIRE - PHQ9
7. TROUBLE CONCENTRATING ON THINGS, SUCH AS READING THE NEWSPAPER OR WATCHING TELEVISION: NOT AT ALL
1. LITTLE INTEREST OR PLEASURE IN DOING THINGS: NOT AT ALL
4. FEELING TIRED OR HAVING LITTLE ENERGY: NOT AT ALL
SUM OF ALL RESPONSES TO PHQ QUESTIONS 1-9: 0
10. IF YOU CHECKED OFF ANY PROBLEMS, HOW DIFFICULT HAVE THESE PROBLEMS MADE IT FOR YOU TO DO YOUR WORK, TAKE CARE OF THINGS AT HOME, OR GET ALONG WITH OTHER PEOPLE: NOT DIFFICULT AT ALL
SUM OF ALL RESPONSES TO PHQ QUESTIONS 1-9: 0
9. THOUGHTS THAT YOU WOULD BE BETTER OFF DEAD, OR OF HURTING YOURSELF: NOT AT ALL
3. TROUBLE FALLING OR STAYING ASLEEP: NOT AT ALL
2. FEELING DOWN, DEPRESSED OR HOPELESS: NOT AT ALL
8. MOVING OR SPEAKING SO SLOWLY THAT OTHER PEOPLE COULD HAVE NOTICED. OR THE OPPOSITE, BEING SO FIGETY OR RESTLESS THAT YOU HAVE BEEN MOVING AROUND A LOT MORE THAN USUAL: NOT AT ALL
SUM OF ALL RESPONSES TO PHQ QUESTIONS 1-9: 0
SUM OF ALL RESPONSES TO PHQ QUESTIONS 1-9: 0
6. FEELING BAD ABOUT YOURSELF - OR THAT YOU ARE A FAILURE OR HAVE LET YOURSELF OR YOUR FAMILY DOWN: NOT AT ALL
SUM OF ALL RESPONSES TO PHQ9 QUESTIONS 1 & 2: 0
5. POOR APPETITE OR OVEREATING: NOT AT ALL

## 2025-02-03 ASSESSMENT — ENCOUNTER SYMPTOMS: GASTROINTESTINAL NEGATIVE: 1

## 2025-02-03 NOTE — PROGRESS NOTES
Rafael West (:  1995) is a 29 y.o. male,Established patient, here for evaluation of the following chief complaint(s):  Follow-up (Pt states he had tick bite R side In December. Pt was seen walk in 2024. Pt request lab req)      Assessment & Plan   ASSESSMENT/PLAN:  1. Tick bite of right back wall of thorax, sequela  -     Lyme Disease Acute Reflexive Panel; Future        PLAN:  Lyme titer ordered.    Return if symptoms worsen or fail to improve.         Subjective   SUBJECTIVE/OBJECTIVE:  Patient here for an Acute Care Visit.  Patient states he had a tick bite on his right thorax in December.  He was seen at a walk-in clinic on 2024.  He was prescribed a 10-day course of doxycycline which he finished.  He is requesting a lab requisition.        Review of Systems   Constitutional:  Negative for chills and fever.   HENT: Negative.     Cardiovascular: Negative.    Gastrointestinal: Negative.    Genitourinary: Negative.    Musculoskeletal: Negative.    Skin:  Positive for wound.   Neurological: Negative.    Psychiatric/Behavioral: Negative.          Immunization History   Administered Date(s) Administered    COVID-19, PFIZER PURPLE top, DILUTE for use, (age 12 y+), 30mcg/0.3mL 2021, 2021, 2021    DTP 1995, 1998, 1998, 1999    DTaP vaccine 2003    Hep B, ENGERIX-B, RECOMBIVAX-HB, (age Birth - 19y), IM, 0.5mL 2003, 2003, 2004    Hib vaccine 1997, 1998, 1998, 1999    Influenza A (E0B1-21) Vaccine PF IM 2009    MMR, PRIORIX, M-M-R II, (age 12m+), SC, 0.5mL 1999, 2003    Meningococcal ACWY, MENACTRA (MenACWY-D), (age 9m-55y), IM, 0.5mL 2010    Polio OPV 1997, 1998, 1998, 10/31/2000    TDaP, ADACEL (age 10y-64y), BOOSTRIX (age 10y+), IM, 0.5mL 2010        Objective   /60 (Position: Sitting)   Pulse 86   Temp 97.4 °F (36.3 °C) (Temporal)   Ht 1.676 m (5' 6\")

## 2025-03-10 ENCOUNTER — TELEPHONE (OUTPATIENT)
Dept: PRIMARY CARE CLINIC | Age: 30
End: 2025-03-10

## 2025-03-10 NOTE — TELEPHONE ENCOUNTER
Appointment canceled for Rafael West ([id number])  Visit type: OFFICE VISIT  3/11/2025 3:30 PM (15 minutes) with Dr. Pancho Doan, DO in [facility]     Reason for cancellation: Feeling better/No longer need this appointment        PC to patient to follow up on the above message  Pt canceled appt   Return in about 6 months (around 3/11/2025) for Follow up.  US SCROTUM AND TESTICLES - PROVIDER FEEDBACK LOOP CALLED 3X       Left VM for patient to contact he office or reschedule in MyChart, link sent.  Currently patient has no future appts

## 2025-03-14 ENCOUNTER — HOSPITAL ENCOUNTER (OUTPATIENT)
Age: 30
Discharge: HOME | End: 2025-03-14
Payer: COMMERCIAL

## 2025-03-14 DIAGNOSIS — S20.461S TICK BITE OF RIGHT BACK WALL OF THORAX, SEQUELA: ICD-10-CM
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
## 2025-03-14 PROCEDURE — 86618 LYME DISEASE ANTIBODY: CPT

## 2025-03-14 PROCEDURE — 36415 COLL VENOUS BLD VENIPUNCTURE: CPT

## 2025-03-18 LAB — B BURGDOR AB SER IA-ACNC: 0.39 IV

## 2025-03-20 ENCOUNTER — RESULTS FOLLOW-UP (OUTPATIENT)
Dept: PRIMARY CARE CLINIC | Age: 30
End: 2025-03-20

## 2025-04-21 ENCOUNTER — OFFICE VISIT (OUTPATIENT)
Dept: PRIMARY CARE CLINIC | Age: 30
End: 2025-04-21
Payer: COMMERCIAL

## 2025-04-21 VITALS
SYSTOLIC BLOOD PRESSURE: 96 MMHG | TEMPERATURE: 98.6 F | DIASTOLIC BLOOD PRESSURE: 72 MMHG | OXYGEN SATURATION: 98 % | BODY MASS INDEX: 23.3 KG/M2 | HEART RATE: 78 BPM | HEIGHT: 66 IN | WEIGHT: 145 LBS

## 2025-04-21 DIAGNOSIS — B34.9 VIRAL SYNDROME: Primary | ICD-10-CM

## 2025-04-21 LAB
INFLUENZA A ANTIGEN, POC: NEGATIVE
INFLUENZA B ANTIGEN, POC: NEGATIVE
LOT EXPIRE DATE: NORMAL
LOT KIT NUMBER: NORMAL
S PYO AG THROAT QL: NORMAL
SARS-COV-2, POC: NORMAL
VALID INTERNAL CONTROL: NORMAL
VENDOR AND KIT NAME POC: NORMAL

## 2025-04-21 PROCEDURE — 87428 SARSCOV & INF VIR A&B AG IA: CPT

## 2025-04-21 PROCEDURE — 99213 OFFICE O/P EST LOW 20 MIN: CPT

## 2025-04-21 PROCEDURE — 87880 STREP A ASSAY W/OPTIC: CPT

## 2025-04-21 NOTE — PROGRESS NOTES
Subjective:  29 y.o. male who presents to the office today with chief complaint:  Chief Complaint   Patient presents with    Cold Symptoms     fever of 101-104 starting on Thursday, ST, sinus drainage, sore throat starting sunday pm, Rt ear pain, feeling clogged starting Monday. Body aches.  Only taking Tylenol      Patient here for sick visit.  States at home he had a fever that started Thursday.  Fever was ranging from 101-143.  States he has been experiencing a sore throat, sinus drainage, body aches, ear pain and fatigue.  He has been using Tylenol at home.  Did have somewhat of a diminished appetite.  States his symptoms have slightly improved since the weekend.    Health Maintenance Due   Topic Date Due    Varicella vaccine (1 of 2 - 13+ 2-dose series) Never done    HIV screen  Never done    Hepatitis C screen  Never done    DTaP/Tdap/Td vaccine (7 - Td or Tdap) 07/30/2020    COVID-19 Vaccine (4 - 2024-25 season) 09/01/2024       Past Medical History:   Diagnosis Date    Asthma     Cancer (HCC) 2017    Basal Cell Carcinoma Neck    Hyperactive airway disease     Vitamin D deficiency        Past Surgical History:   Procedure Laterality Date    CARPAL TUNNEL RELEASE Right 05/16/2023    ELBOW SURGERY  05/16/2023    FOOT FRACTURE SURGERY Right     REFRACTIVE SURGERY      WRIST SURGERY Right         No current outpatient medications on file.     No current facility-administered medications for this visit.        Review of Systems   Constitutional:  Positive for fatigue and fever. Negative for appetite change, chills and diaphoresis.   HENT:  Positive for congestion, rhinorrhea and sore throat. Negative for hearing loss.    Eyes:  Negative for photophobia and visual disturbance.   Respiratory:  Negative for cough, shortness of breath and wheezing.    Cardiovascular:  Negative for chest pain and palpitations.   Gastrointestinal:  Negative for abdominal pain, constipation, diarrhea and vomiting.   Musculoskeletal:

## 2025-04-25 ASSESSMENT — ENCOUNTER SYMPTOMS
COUGH: 0
WHEEZING: 0
PHOTOPHOBIA: 0
SORE THROAT: 1
CONSTIPATION: 0
BACK PAIN: 0
ABDOMINAL PAIN: 0
SHORTNESS OF BREATH: 0
RHINORRHEA: 1
VOMITING: 0
DIARRHEA: 0